# Patient Record
(demographics unavailable — no encounter records)

---

## 2024-10-23 NOTE — END OF VISIT
[FreeTextEntry3] : "I, Mauircio Pratt, personally scribed the services dictated to me by Dr. Iraj Palacios MD in this documentation on 10/23/2024"   "I Dr. Iraj Palacios MD, personally performed the services described in this documentation on 10/23/2024 for the patient as scribed by Mauricio Pratt in my presence. I have reviewed and verified that all the information is accurate and true."

## 2024-10-23 NOTE — HEALTH RISK ASSESSMENT
[Never (0 pts)] : Never (0 points) [No] : In the past 12 months have you used drugs other than those required for medical reasons? No [No falls in past year] : Patient reported no falls in the past year [Little interest or pleasure doing things] : 1) Little interest or pleasure doing things [Feeling down, depressed, or hopeless] : 2) Feeling down, depressed, or hopeless [0] : 2) Feeling down, depressed, or hopeless: Not at all (0) [PHQ-2 Negative - No further assessment needed] : PHQ-2 Negative - No further assessment needed [Never] : Never [[id number]] : 0

## 2024-10-23 NOTE — PLAN
[FreeTextEntry1] : continue medications Adderall Lorazepam Trazadone chronic medical conditions ADHD anxiety Follow up with [facility]

## 2024-10-23 NOTE — HISTORY OF PRESENT ILLNESS
[Spouse] : spouse [FreeTextEntry1] : follow up  [de-identified] :  DORIS GREEN is a 44 year old M who presents today for follow up for medication renewal. Pt has a hx of ADHD and anxiety. Pt states feeling more focused and less anxious on medications. Pt gets Suboxone from his outpatient rehab. Pt states his bloodwork revealing low testosterone levels.

## 2024-11-20 NOTE — HISTORY OF PRESENT ILLNESS
[Spouse] : spouse [FreeTextEntry1] : follow up  [de-identified] : DORIS GREEN is a 44 year old M who presents today for follow up for medication renewal. Pt has a hx of ADHD and anxiety. Pt states feeling more focused and less anxious on medications. Pt has no new complaints.

## 2024-11-20 NOTE — END OF VISIT
[FreeTextEntry3] : "I, Mauricio Pratt, personally scribed the services dictated to me by Dr. Iraj Palacios MD in this documentation on 11/20/2024"   "I Dr. Iraj Palacios MD, personally performed the services described in this documentation on 11/20/2024 for the patient as scribed by Mauricio Pratt in my presence. I have reviewed and verified that all the information is accurate and true."

## 2024-11-20 NOTE — PLAN
Yes [FreeTextEntry1] : continue medications Adderall Lexapro Lorazepam Trazadone chronic medical conditions ADHD anxiety Follow up with [facility]. return 1 month

## 2024-11-20 NOTE — HEALTH RISK ASSESSMENT
[Never (0 pts)] : Never (0 points) [No] : In the past 12 months have you used drugs other than those required for medical reasons? No [No falls in past year] : Patient reported no falls in the past year [Little interest or pleasure doing things] : 1) Little interest or pleasure doing things [Feeling down, depressed, or hopeless] : 2) Feeling down, depressed, or hopeless [0] : 2) Feeling down, depressed, or hopeless: Not at all (0) [PHQ-2 Negative - No further assessment needed] : PHQ-2 Negative - No further assessment needed [Never] : Never [[id number]] : 0

## 2024-12-17 NOTE — HISTORY OF PRESENT ILLNESS
[FreeTextEntry1] : follow up  [de-identified] : DORIS GREEN is a 44-year-old M who presents today for follow up for medication renewal. Pt has a hx of ADHD and anxiety. Pt states feeling more focused and less anxious on medications. Pt has no new complaints.

## 2024-12-17 NOTE — HEALTH RISK ASSESSMENT
[Never (0 pts)] : Never (0 points) [No] : In the past 12 months have you used drugs other than those required for medical reasons? No [No falls in past year] : Patient reported no falls in the past year [Little interest or pleasure doing things] : 1) Little interest or pleasure doing things [Feeling down, depressed, or hopeless] : 2) Feeling down, depressed, or hopeless [0] : 2) Feeling down, depressed, or hopeless: Not at all (0) [PHQ-2 Negative - No further assessment needed] : PHQ-2 Negative - No further assessment needed [Never] : Never [[id number]] : 0

## 2024-12-17 NOTE — END OF VISIT
[FreeTextEntry3] : "I, Mauricio Pratt, personally scribed the services dictated to me by Dr. Iraj Palacios MD in this documentation on 12/17/2024"   "I Dr. Iraj Palacios MD, personally performed the services described in this documentation on 12/17/2024 for the patient as scribed by Mauricio Pratt in my presence. I have reviewed and verified that all the information is accurate and true."

## 2024-12-17 NOTE — PLAN
[FreeTextEntry1] : continue medications Adderall Lexapro Lorazepam Trazadone chronic medical conditions ADHD anxiety Follow [facility]. Return 1 month.  Advised to stop smoking

## 2025-01-15 NOTE — PLAN
[FreeTextEntry1] : continue medications Adderall Lexapro Lorazepam Trazadone chronic medical conditions ADHD anxiety Follow up with [facility]. Return 1 month.

## 2025-01-15 NOTE — HISTORY OF PRESENT ILLNESS
[Spouse] : spouse [FreeTextEntry1] : follow up  [de-identified] : DORIS GREEN is a 44-year-old M who presents today for follow up for medication renewal. Pt has a hx of ADHD and anxiety. Pt states feeling more focused and less anxious on medications. Pt has no new complaints.

## 2025-01-15 NOTE — END OF VISIT
[FreeTextEntry3] : "I, Mauricio Pratt, personally scribed the services dictated to me by Dr. Iraj Palacios MD in this documentation on 01/15/2025"   "I Dr. Iraj Palacios MD, personally performed the services described in this documentation on 01/15/2025 for the patient as scribed by Mauricio Pratt in my presence. I have reviewed and verified that all the information is accurate and true."

## 2025-01-15 NOTE — HEALTH RISK ASSESSMENT
[No] : In the past 12 months have you used drugs other than those required for medical reasons? No [No falls in past year] : Patient reported no falls in the past year [Little interest or pleasure doing things] : 1) Little interest or pleasure doing things [Feeling down, depressed, or hopeless] : 2) Feeling down, depressed, or hopeless [0] : 2) Feeling down, depressed, or hopeless: Not at all (0) [PHQ-2 Negative - No further assessment needed] : PHQ-2 Negative - No further assessment needed [Current] : Current [[id number]] : 0

## 2025-02-14 NOTE — HEALTH RISK ASSESSMENT
[No] : No [No falls in past year] : Patient reported no falls in the past year [0] : 2) Feeling down, depressed, or hopeless: Not at all (0) [PHQ-2 Negative - No further assessment needed] : PHQ-2 Negative - No further assessment needed [Current] : Current [[id number]] : 0

## 2025-02-14 NOTE — ADDENDUM
[FreeTextEntry1] : "I, Tara Bowman, personally scribed the services dictated to me by Dr Iraj Palacios MD in this documentation on -02/14/2025-"  "I Dr Iraj Palacios MD personally performed the services described in this documentation on -02/14/2025- for the patient as scribed by Tara Bowman in my presence. I have reviewed and verified that the information is accurate and true"

## 2025-02-14 NOTE — HISTORY OF PRESENT ILLNESS
[Spouse] : spouse [FreeTextEntry1] : f/u for med renewal  [de-identified] : DORIS SCHRADEREDSON is a 45-year M who presents today for f/u on medication renewal. Pt has a PMHx of ADHD, Anxiety, GERD, and IBS. going to IOP Patient needs medications renewed  getting [facility]oxone from [facility]

## 2025-02-14 NOTE — COUNSELING
[Cessation strategies including cessation program discussed] : Cessation strategies including cessation program discussed [Encouraged to pick a quit date and identify support needed to quit] : Encouraged to pick a quit date and identify support needed to quit [Yes] : Willing to quit smoking

## 2025-02-14 NOTE — HEALTH RISK ASSESSMENT
[No] : No [No falls in past year] : Patient reported no falls in the past year [0] : 2) Feeling down, depressed, or hopeless: Not at all (0) [PHQ-2 Negative - No further assessment needed] : PHQ-2 Negative - No further assessment needed [Current] : Current [[id number]] : 0

## 2025-02-14 NOTE — HISTORY OF PRESENT ILLNESS
[Spouse] : spouse [FreeTextEntry1] : f/u for med renewal  [de-identified] : DORIS SCHRADEREDSON is a 45-year M who presents today for f/u on medication renewal. Pt has a PMHx of ADHD, Anxiety, GERD, and IBS. going to IOP Patient needs medications renewed  getting [facility]oxone from [facility]

## 2025-02-14 NOTE — PLAN
[FreeTextEntry1] : DORIS SCHRADEREDSON is a 45-year M who presents today for f/u on medication renewal. Pt has a PMHx of ADHD, Anxiety, GERD, and IBS. continue Medications Continue IOP   return 1 month

## 2025-02-14 NOTE — HEALTH RISK ASSESSMENT
[No] : No [No falls in past year] : Patient reported no falls in the past year [0] : 2) Feeling down, depressed, or hopeless: Not at all (0) [PHQ-2 Negative - No further assessment needed] : PHQ-2 Negative - No further assessment needed [Current] : Current [[id number]] : 0

## 2025-02-14 NOTE — HISTORY OF PRESENT ILLNESS
[Spouse] : spouse [FreeTextEntry1] : f/u for med renewal  [de-identified] : DORIS SCHRADEREDSON is a 45-year M who presents today for f/u on medication renewal. Pt has a PMHx of ADHD, Anxiety, GERD, and IBS. going to IOP Patient needs medications renewed  getting [facility]oxone from [facility]

## 2025-03-12 NOTE — PLAN
[FreeTextEntry1] : Advised to stop smoking Continue medications return 1 month renewal of Adderall and Ativan

## 2025-03-12 NOTE — HISTORY OF PRESENT ILLNESS
[Spouse] : spouse [FreeTextEntry1] : Follow up [de-identified] : DORIS GREEN is a 45 year old M who presents today for renewals of medications going  to [facility]care  more focused on Medication

## 2025-04-11 NOTE — HISTORY OF PRESENT ILLNESS
[FreeTextEntry1] : Follow up [de-identified] : DORIS SCHRADEREDSON is a 45 year old M who presents today for renewal of Ativan and Adderall  goes to a outpatient treatment still on Suboxone

## 2025-05-09 NOTE — HEALTH RISK ASSESSMENT
[No] : In the past 12 months have you used drugs other than those required for medical reasons? No [No falls in past year] : Patient reported no falls in the past year [Current] : Current

## 2025-05-09 NOTE — HISTORY OF PRESENT ILLNESS
[FreeTextEntry1] : follow up  [de-identified] : DORIS GREEN is a 45 year old M who presents today for renewal of medication now on

## 2025-06-06 NOTE — HEALTH RISK ASSESSMENT
[No] : In the past 12 months have you used drugs other than those required for medical reasons? No [Current] : Current

## 2025-06-06 NOTE — HISTORY OF PRESENT ILLNESS
[FreeTextEntry1] : Follow up [de-identified] : DORIS NORMA is a 45 year old M who presents today for renewal of medications has anxiety ADHD Goes to [facility]

## 2025-06-27 NOTE — PLAN
[FreeTextEntry1] : Further instructions pending lab results. Advised pt to take 20 mg Adderall BID instead of once daily. Continue all other medications. Continue f/u with Psychiatrist.

## 2025-06-27 NOTE — END OF VISIT
[FreeTextEntry2] : I, Ismael Dougherty, am scribing for and in the presence of Dr. Palacios in the following sections HISTORY OF PRESENT ILLNESS, PAST MEDICAL/FAMILY/SOCIAL HISTORY; REVIEW OF SYSTEMS; VITAL SIGNS; PHYSICAL EXAM; ASSESSMENT/PLAN.       I personally performed the services described in the documentation, reviewed the documentation recorded by the scribe in my presence, and it accurately and completely records my words and actions.

## 2025-06-27 NOTE — HISTORY OF PRESENT ILLNESS
[Spouse] : spouse [FreeTextEntry1] : Follow up [de-identified] :  Pt. is a 44 y/o male presenting for follow up. PMH of Prediabetes. Pt reports his focus has not been lasting as long on his current dose of Adderall. He has asked if his medication dosage can be increased.

## 2025-06-27 NOTE — ADDENDUM
[FreeTextEntry1] : Documented by Ismael Dougherty acting as a scribe under Dr. Palacios. 6/27/25       All medical record entries made by the Scribe were at my, Dr. Iraj Palacios, direction and   personally dictated by me on 6/27/25. I have reviewed the chart and agree that the record   accurately reflects my personal performance of the history, physical exam, assessment and   plan. I have also personally directed, reviewed, and agreed with the chart.